# Patient Record
Sex: FEMALE | Race: WHITE | NOT HISPANIC OR LATINO | URBAN - METROPOLITAN AREA
[De-identification: names, ages, dates, MRNs, and addresses within clinical notes are randomized per-mention and may not be internally consistent; named-entity substitution may affect disease eponyms.]

---

## 2018-05-25 ENCOUNTER — APPOINTMENT (EMERGENCY)
Dept: RADIOLOGY | Facility: HOSPITAL | Age: 83
End: 2018-05-25
Attending: EMERGENCY MEDICINE
Payer: MEDICARE

## 2018-05-25 ENCOUNTER — HOSPITAL ENCOUNTER (EMERGENCY)
Facility: HOSPITAL | Age: 83
Discharge: HOME | End: 2018-05-25
Attending: EMERGENCY MEDICINE
Payer: MEDICARE

## 2018-05-25 VITALS
OXYGEN SATURATION: 97 % | RESPIRATION RATE: 18 BRPM | SYSTOLIC BLOOD PRESSURE: 159 MMHG | HEART RATE: 75 BPM | TEMPERATURE: 97.9 F | DIASTOLIC BLOOD PRESSURE: 81 MMHG

## 2018-05-25 DIAGNOSIS — T67.1XXA HEAT SYNCOPE, INITIAL ENCOUNTER: Primary | ICD-10-CM

## 2018-05-25 LAB
ANION GAP SERPL CALC-SCNC: 10 MEQ/L (ref 3–15)
BACTERIA URNS QL MICRO: ABNORMAL /UL
BASOPHILS # BLD: 0.07 K/UL (ref 0.01–0.1)
BASOPHILS NFR BLD: 0.8 %
BILIRUB UR QL STRIP.AUTO: NEGATIVE MG/DL
BUN SERPL-MCNC: 30 MG/DL (ref 8–20)
CALCIUM SERPL-MCNC: 9.5 MG/DL (ref 8.9–10.3)
CHLORIDE SERPL-SCNC: 104 MMOL/L (ref 98–109)
CLARITY UR REFRACT.AUTO: ABNORMAL
CO2 SERPL-SCNC: 23 MMOL/L (ref 22–32)
COLOR UR AUTO: YELLOW
CREAT SERPL-MCNC: 1 MG/DL (ref 0.6–1.1)
DIFFERENTIAL METHOD BLD: ABNORMAL
EOSINOPHIL # BLD: 0.44 K/UL (ref 0.04–0.36)
EOSINOPHIL NFR BLD: 5.3 %
ERYTHROCYTE [DISTWIDTH] IN BLOOD BY AUTOMATED COUNT: 13.5 % (ref 11.7–14.4)
ETHANOL SERPL-MCNC: <5 MG/DL
GFR SERPL CREATININE-BSD FRML MDRD: 53.2 ML/MIN/1.73M*2
GLUCOSE SERPL-MCNC: 106 MG/DL (ref 70–99)
GLUCOSE UR STRIP.AUTO-MCNC: NEGATIVE MG/DL
HCT VFR BLDCO AUTO: 36.2 % (ref 35–45)
HGB BLD-MCNC: 12.2 G/DL (ref 11.8–15.7)
HGB UR QL STRIP.AUTO: NEGATIVE
HYALINE CASTS #/AREA URNS LPF: ABNORMAL /LPF
IMM GRANULOCYTES # BLD AUTO: 0.05 K/UL (ref 0–0.08)
IMM GRANULOCYTES NFR BLD AUTO: 0.6 %
KETONES UR STRIP.AUTO-MCNC: NEGATIVE MG/DL
LEUKOCYTE ESTERASE UR QL STRIP.AUTO: 2
LYMPHOCYTES # BLD: 3 K/UL (ref 1.2–3.5)
LYMPHOCYTES NFR BLD: 36.2 %
MCH RBC QN AUTO: 30.3 PG (ref 28–33.2)
MCHC RBC AUTO-ENTMCNC: 33.7 G/DL (ref 32.2–35.5)
MCV RBC AUTO: 89.8 FL (ref 83–98)
MONOCYTES # BLD: 0.76 K/UL (ref 0.28–0.8)
MONOCYTES NFR BLD: 9.2 %
NEUTROPHILS # BLD: 3.96 K/UL (ref 1.7–7)
NEUTS SEG NFR BLD: 47.9 %
NITRITE UR QL STRIP.AUTO: NEGATIVE
NRBC BLD-RTO: 0 %
PDW BLD AUTO: 11.5 FL (ref 9.4–12.3)
PH UR STRIP.AUTO: 6 [PH]
PLATELET # BLD AUTO: 213 K/UL (ref 150–369)
POTASSIUM SERPL-SCNC: 3.4 MMOL/L (ref 3.6–5.1)
PROT UR QL STRIP.AUTO: NEGATIVE
RBC # BLD AUTO: 4.03 M/UL (ref 3.93–5.22)
RBC #/AREA URNS HPF: ABNORMAL /HPF
SODIUM SERPL-SCNC: 137 MMOL/L (ref 136–144)
SP GR UR REFRACT.AUTO: 1.02
SQUAMOUS URNS QL MICRO: 1 /HPF
TROPONIN I SERPL-MCNC: <0.02 NG/ML
UROBILINOGEN UR STRIP-ACNC: 0.2 EU/DL
WBC # BLD AUTO: 8.28 K/UL (ref 3.8–10.5)
WBC #/AREA URNS HPF: ABNORMAL /HPF

## 2018-05-25 PROCEDURE — 96360 HYDRATION IV INFUSION INIT: CPT

## 2018-05-25 PROCEDURE — 87086 URINE CULTURE/COLONY COUNT: CPT | Mod: GZ | Performed by: PHYSICIAN ASSISTANT

## 2018-05-25 PROCEDURE — 71045 X-RAY EXAM CHEST 1 VIEW: CPT

## 2018-05-25 PROCEDURE — 25800000 HC PHARMACY IV SOLUTIONS: Performed by: PHYSICIAN ASSISTANT

## 2018-05-25 PROCEDURE — 36415 COLL VENOUS BLD VENIPUNCTURE: CPT | Performed by: PHYSICIAN ASSISTANT

## 2018-05-25 PROCEDURE — 3E0337Z INTRODUCTION OF ELECTROLYTIC AND WATER BALANCE SUBSTANCE INTO PERIPHERAL VEIN, PERCUTANEOUS APPROACH: ICD-10-PCS | Performed by: EMERGENCY MEDICINE

## 2018-05-25 PROCEDURE — G0480 DRUG TEST DEF 1-7 CLASSES: HCPCS | Performed by: PHYSICIAN ASSISTANT

## 2018-05-25 PROCEDURE — 80048 BASIC METABOLIC PNL TOTAL CA: CPT | Performed by: PHYSICIAN ASSISTANT

## 2018-05-25 PROCEDURE — 93005 ELECTROCARDIOGRAM TRACING: CPT | Performed by: PHYSICIAN ASSISTANT

## 2018-05-25 PROCEDURE — 99284 EMERGENCY DEPT VISIT MOD MDM: CPT | Mod: 25

## 2018-05-25 PROCEDURE — 84484 ASSAY OF TROPONIN QUANT: CPT | Performed by: PHYSICIAN ASSISTANT

## 2018-05-25 PROCEDURE — 85025 COMPLETE CBC W/AUTO DIFF WBC: CPT | Performed by: PHYSICIAN ASSISTANT

## 2018-05-25 PROCEDURE — 70450 CT HEAD/BRAIN W/O DYE: CPT

## 2018-05-25 PROCEDURE — 81001 URINALYSIS AUTO W/SCOPE: CPT | Performed by: PHYSICIAN ASSISTANT

## 2018-05-25 RX ORDER — ASCORBIC ACID 1000 MG
TABLET ORAL
COMMUNITY

## 2018-05-25 RX ADMIN — SODIUM CHLORIDE 1000 ML: 9 INJECTION, SOLUTION INTRAVENOUS at 17:16

## 2018-05-25 NOTE — ED ATTESTATION NOTE
I have personally seen, evaluated,and participated in the management of Dea Luz.  I reviewed and agree with the PA/NP's assessment and plan of care with the following exceptions: None    My examination, assessment, and plan of care for Dea Luz:  82-year-old female without any significant past medical history presented with syncope witnessed by family members.  Patient has been sunbathing in the sun today for only a short while approximately 1-2 hours along with 1 glass of wine when she stood up and felt dizzy lightheaded and about to faint.  Stood still but was not able to stop the progressive loss of consciousness and lightheadedness.  No seizure-like activities patient denies any chest pain shortness of breath nausea vomiting diarrhea hematuria or bloody stools  Exam:  Aa, rrr, no murmur, cta, soft nontender abd, nontender nonswollen calves  Plan:  EKG chest x-ray IV fluids labs CAT scan of the head     Juan R Ayala MD  05/25/18 4854

## 2018-05-25 NOTE — DISCHARGE INSTRUCTIONS
Follow up with your PCP or [facility] for further evaluation  Drink plenty of fluids over the next 24 hrs

## 2018-05-25 NOTE — ED PROVIDER NOTES
"HPI     Chief Complaint   Patient presents with   • Syncope     Patient presents via [facility].  While on the pool deck \"passed out briefly\"         82-year-old female brought in by ambulance for syncopal episode prior to arrival.  Patient states she was sitting in the sun all day, patient went to stand up fell abnormal and lightheaded, patient then had a witnessed syncopal episode at first where she slumped into her daughter's arms.  Patient then was awoken stood up but then passed out again felt to the ground but was caught by her daughter.  Patient did not hit her head did not sustain any injuries.  Patient felt woozy EMS was called is that she had a positive tilt test, patient was given fluids in route and states she feels significantly better.  Patient admits to 1 glass of wine today.  Patient denies symptoms now such as dizziness chest pain shortness of breath change in vision weakness or numbness in her extremities.  Patient does not take a blood thinner.             Patient History     History reviewed. No pertinent past medical history.    History reviewed. No pertinent surgical history.    History reviewed. No pertinent family history.    Social History   Substance Use Topics   • Smoking status: Never Smoker   • Smokeless tobacco: Never Used   • Alcohol use Yes      Comment: Today       Systems Reviewed from Nursing Triage:  Tobacco  Allergies  Meds  Problems  Med Hx  Surg Hx  Soc Hx         Review of Systems     Review of Systems   All other systems reviewed and are negative.       Physical Exam     ED Triage Vitals [05/25/18 1709]   Temp Heart Rate Resp BP SpO2   37.1 °C (98.8 °F) 79 16 (!) 140/53 95 %      Temp Source Heart Rate Source Patient Position BP Location FiO2 (%) (Set)   Tympanic Monitor -- -- --       Pulse Ox %: 95 % (05/25/18 1803)  Pulse Ox Interpretation: Normal (05/25/18 1803)  Heart Rate: 80 (05/25/18 1803)  Rhythm Strip Interpretation: Normal Sinus Rhythm (05/25/18 " 1803)    Patient Vitals for the past 24 hrs:   BP Temp Temp src Pulse Resp SpO2   05/25/18 1709 (!) 140/53 37.1 °C (98.8 °F) Tympanic 79 16 95 %           Physical Exam   Constitutional: She appears well-developed and well-nourished. No distress.   HENT:   Head: Normocephalic and atraumatic.   Eyes: Conjunctivae are normal.   Neck: Neck supple.   Cardiovascular: Normal rate and regular rhythm.    No murmur heard.  Pulmonary/Chest: Effort normal and breath sounds normal. No respiratory distress.   Abdominal: Soft. There is no tenderness.   Musculoskeletal: She exhibits no edema.   Neurological: She is alert.   Skin: Skin is warm and dry.   Psychiatric: She has a normal mood and affect.   Nursing note and vitals reviewed.           Procedures    ED Course & MDM     Labs Reviewed   BASIC METABOLIC PANEL - Abnormal        Result Value    Potassium 3.4 (*)     BUN 30 (*)     Glucose 106 (*)     eGFR 53.2 (*)     Sodium 137      Chloride 104      CO2 23      Creatinine 1.0      Calcium 9.5      Anion Gap 10     DIFF COUNT - Abnormal     Eosinophils, Absolute 0.44 (*)     Differential Type Auto      nRBC 0.0      Immature Granulocytes 0.6      Neutrophils 47.9      Lymphocytes 36.2      Monocytes 9.2      Eosinophils 5.3      Basophils 0.8      Immature Granulocytes, Absolute 0.05      Neutrophils, Absolute 3.96      Lymphocytes, Absolute 3.00      Monocytes, Absolute 0.76      Basophils, Absolute 0.07     UA REFLEX CULTURE (MACROSCOPIC) - Abnormal     Clarity, Urine Cloudy (*)     Leukocyte Esterase +2 (*)     Color, Urine Yellow      Specific Gravity, Urine 1.021      pH, Urine 6.0      Nitrite, Urine Negative      Protein, Urine Negative      Glucose, Urine Negative      Ketones, Urine Negative      Urobilinogen, Urine 0.2      Bilirubin, Urine Negative      Blood, Urine Negative     UA MICROSCOPIC - Abnormal     WBC, Urine 4 TO 10 (*)     Squamous Epithelial +1 (*)     Bacteria, Urine None Seen      RBC, Urine 0 TO 4       Hyaline Casts None Seen     TROPONIN I - Normal    Troponin I <0.02     CBC - Normal    WBC 8.28      RBC 4.03      Hemoglobin 12.2      Hematocrit 36.2      MCV 89.8      MCH 30.3      MCHC 33.7      RDW 13.5      Platelets 213      MPV 11.5     ETHANOL - Normal    Ethanol Lvl <5     URINE CULTURE   CBC AND DIFFERENTIAL    Narrative:     The following orders were created for panel order CBC and differential.  Procedure                               Abnormality         Status                     ---------                               -----------         ------                     CBC[[id number]]                           Normal              Final result               Diff Count[[id number]]                    Abnormal            Final result                 Please view results for these tests on the individual orders.   URINALYSIS REFLEX CULTURE    Narrative:     The following orders were created for panel order Urinalysis w/ reflex culture.  Procedure                               Abnormality         Status                     ---------                               -----------         ------                     UA Reflex to Culture (Mac...[[id number]]  Abnormal            Final result               UA Microscopic[[id number]]                Abnormal            Final result                 Please view results for these tests on the individual orders.       CT HEAD WITHOUT IV CONTRAST   Final Result   IMPRESSION:   1. No evidence of acute infarct, hemorrhage, mass or mass effect.      COMMENT:      Brain parenchyma: Normal CT appearance.   Ventricles, cisterns, and sulci: Diffusely enlarged, consistent with   age-appropriate central and cortical volume loss.      Calvarium and extra cranial soft tissues: Unremarkable.   Visualized paranasal sinuses and mastoid air cells: Prominent mucosal   thickening and dependent fluid in the left sphenoid sinus.               X-RAY CHEST 1 VIEW   Final Result   IMPRESSION:    Radiographically normal chest.      COMMENT:   A single portable AP erect chest radiograph is interpreted without   comparison. The cardiomediastinal contour is normal. The lungs are clear   bilaterally. The osseous thorax and surrounding soft tissues are unremarkable.         ECG 12 lead   ED Interpretation   Vent rate 81 bpm, CT interval 142 ms, QRS duration 84 ms QT/QTc 418/485 ms   Sinus rhythm with premature supraventricular complexes, prolonged QT              MDM         ED Course          Clinical Impressions as of May 25 2015   Heat syncope, initial encounter     Disposition:  Discharge     ARCHANA Miller  05/25/18 2015

## 2018-05-27 LAB
ATRIAL RATE: 81
BACTERIA UR CULT: NORMAL
BACTERIA UR CULT: NORMAL
P AXIS: 86
PR INTERVAL: 142
QRS DURATION: 84
QT INTERVAL: 418
QTC CALCULATION(BAZETT): 485
R AXIS: 64
T WAVE AXIS: 80
VENTRICULAR RATE: 81

## 2023-05-25 ENCOUNTER — ESTABLISHED PATIENT (OUTPATIENT)
Facility: LOCATION | Age: 88
End: 2023-05-25

## 2023-05-25 DIAGNOSIS — Z98.890: ICD-10-CM

## 2023-05-25 DIAGNOSIS — Z96.1: ICD-10-CM

## 2023-05-25 DIAGNOSIS — H10.45: ICD-10-CM

## 2023-05-25 DIAGNOSIS — H40.013: ICD-10-CM

## 2023-05-25 DIAGNOSIS — H16.223: ICD-10-CM

## 2023-05-25 PROCEDURE — 92133 CPTRZD OPH DX IMG PST SGM ON: CPT

## 2023-05-25 PROCEDURE — 83861 MICROFLUID ANALY TEARS: CPT

## 2023-05-25 PROCEDURE — 92014 COMPRE OPH EXAM EST PT 1/>: CPT

## 2023-05-25 ASSESSMENT — TONOMETRY
OD_IOP_MMHG: 10
OS_IOP_MMHG: 9

## 2023-05-25 ASSESSMENT — VISUAL ACUITY
OS_SC: J2
OS_PH: 20/30
OS_SC: 20/50
OD_SC: J10
OD_SC: 20/30

## 2024-05-30 ENCOUNTER — ESTABLISHED PATIENT (OUTPATIENT)
Facility: LOCATION | Age: 89
End: 2024-05-30

## 2024-05-30 DIAGNOSIS — H40.013: ICD-10-CM

## 2024-05-30 DIAGNOSIS — H16.223: ICD-10-CM

## 2024-05-30 DIAGNOSIS — Z98.890: ICD-10-CM

## 2024-05-30 DIAGNOSIS — Z96.1: ICD-10-CM

## 2024-05-30 DIAGNOSIS — H10.45: ICD-10-CM

## 2024-05-30 PROCEDURE — 92133 CPTRZD OPH DX IMG PST SGM ON: CPT

## 2024-05-30 PROCEDURE — 92014 COMPRE OPH EXAM EST PT 1/>: CPT

## 2024-05-30 ASSESSMENT — VISUAL ACUITY
OS_SC: 20/50+2
OD_SC: 20/40-2
OS_SC: J2
OD_PH: 20/25
OS_PH: 20/30-2
OD_SC: J2-2

## 2024-05-30 ASSESSMENT — TONOMETRY
OS_IOP_MMHG: 10
OD_IOP_MMHG: 10

## 2024-12-04 ENCOUNTER — COMPREHENSIVE EXAM (OUTPATIENT)
Age: 89
End: 2024-12-04

## 2024-12-04 DIAGNOSIS — H40.013: ICD-10-CM

## 2024-12-04 DIAGNOSIS — Z96.1: ICD-10-CM

## 2024-12-04 DIAGNOSIS — H35.053: ICD-10-CM

## 2024-12-04 DIAGNOSIS — Z98.890: ICD-10-CM

## 2024-12-04 DIAGNOSIS — H10.45: ICD-10-CM

## 2024-12-04 DIAGNOSIS — H16.223: ICD-10-CM

## 2024-12-04 PROCEDURE — 92133 CPTRZD OPH DX IMG PST SGM ON: CPT

## 2024-12-04 PROCEDURE — 99214 OFFICE O/P EST MOD 30 MIN: CPT

## 2025-06-11 ENCOUNTER — COMPREHENSIVE EXAM (OUTPATIENT)
Age: 89
End: 2025-06-11

## 2025-06-11 DIAGNOSIS — H35.053: ICD-10-CM

## 2025-06-11 DIAGNOSIS — Z96.1: ICD-10-CM

## 2025-06-11 DIAGNOSIS — H40.013: ICD-10-CM

## 2025-06-11 DIAGNOSIS — H10.45: ICD-10-CM

## 2025-06-11 DIAGNOSIS — H52.4: ICD-10-CM

## 2025-06-11 DIAGNOSIS — H16.223: ICD-10-CM

## 2025-06-11 PROCEDURE — 92133 CPTRZD OPH DX IMG PST SGM ON: CPT

## 2025-06-11 PROCEDURE — 92015KEC REFRACTION KEC

## 2025-06-11 PROCEDURE — 92014 COMPRE OPH EXAM EST PT 1/>: CPT
